# Patient Record
(demographics unavailable — no encounter records)

---

## 2024-10-14 NOTE — PHYSICAL EXAM
[FreeTextEntry1] : General - NAD Neuro MOCA  scored today:  22.5/30 Oct 8,  20.5/30 in August, and 19/30 in June Executive functioning - [place] B - successful without cues  Attention: Serial 7's - successful only to 93. Serial 5's - successful to 80 without cues or delays. Simple calculations - successful  Memory: Recall 3 words - Cat, Shoe, Table - Cat with cues, guessed "shoe" with cues, couldn't recall "table" Days of the week in reverse - successful  Abstract reasoning - successful  AAO to year, not to month, not to day of week without looking at cell phone, not to place without cues, alert to self, not aware of medical situation.    CN 2-12 WNL AQUILES UE and LE 5/5 Finger to nose and heel to meade AQUILES WNL   Light touch sensation intact bilaterally UE and LE

## 2024-10-14 NOTE — HISTORY OF PRESENT ILLNESS
[FreeTextEntry1] :   45 yo RHD M w/ h/o HTN, pneumothorax, anxiety, and current tobacco use (2ppd), who was BIBEMS to [facility] on 09/14/2023 with anoxic brain injury following v-fib arrest w/ ROSC at home.  Last seen 8-7-2024: Patient is accompanied with his wife and mother.  Wife states that her  is awake and alert, but has no motivation or initiation of actions.  His short term memory may be somewhat improving, but wife states that he still barely remembers anything on his own, without hints or cues.  Mr Carranza agrees that he is having trouble remembering, and this is very frustrating to him.  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  He is currently taking Modafinil 200 and amantadine 100 in the morning.  When the modafinil increased from 150 to 200 on Sept 12, there were no major changes in alertness or initiation.    He denies having any mood issues in the past, but that he now gets more emotional than he used to, though he and his wife state that he is not "down or depressed". He is expressing more enthusiasm and tearfulness with enthusiasm, but not with sadness or anger.    He is having trouble initiating activities, and he states that it is because he doesn't "think to do these things".  He needs prompting to take a shower, and it took him two or more hours to take a shower with constant prompting.  He requires cues to remember to brush his teeth, get dressed, remember to eat.  He does sleep well at night, about 8 hours per night.  They take a daily walk, about 30 minutes.  He has not had any falls or loss of balance.    He is not currently in any therapies, because he was not progressing any further in his therapies.  He is in acupuncture now every other week, and has been doing it for 8 weeks, but he does not feel that it is helping.      Last seen 6/10/24. His amantadine and modafinil were stopped last visit. 6/13--wife called reporting pt. was too lethargic. restarted on lower dose of modafinil 100mg. MRI brain was ordered. On 6/20 arousal was still an issue-- wife called-- added amantadine 100mg. Stopped Mirtazepine. Referred for a Trial of acupuncture. wife reports he is doing better. Alert and awake during the day. slightly better initiation and engagement. sleeping through the night off mirtazapine.  He was much better off of donepezil and memantine-- no longer having GI sx.  Supervision with ADLs-- needs prompts. ambulates inside and outside home with supervision. No falls  Meds: bisaprolol 2.5mg brillinta 90mg bid amantadine 100 mg modafinil 200mg -- increased by Dr. Darlin Ndiaye

## 2024-10-14 NOTE — PHYSICAL EXAM
[FreeTextEntry1] : General - NAD Neuro MOCA  scored today:  22.5/30 Oct 8,  20.5/30 in August, and 19/30 in June Executive functioning - [place] B - successful without cues  Attention: Serial 7's - successful only to 93. Serial 5's - successful to 80 without cues or delays. Simple calculations - successful  Memory: Recall 3 words - Cat, Shoe, Table - Cat with cues, guessed "shoe" with cues, couldn't recall "table" Days of the week in reverse - successful  Abstract reasoning - successful  AAO to year, not to month, not to day of week without looking at cell phone, not to place without cues, alert to self, not aware of medical situation.    CN 2-12 WNL AQUILES UE and LE 5/5 Finger to nose and heel to meade AQUILES WNL   Light touch sensation intact bilaterally UE and LE

## 2024-10-14 NOTE — ASSESSMENT
[FreeTextEntry1] : --Consider re-initiating Neuropsychology to work on cognitive therapies, Referral to Dr Abbe Gustafson, [facility] placed today  Plan to research photobiomodulation to discuss any therapeutic potential with Raul and his wife as well as look into literature on any other Treatments that have shown benefit for Anoxic brain injury.   Trial Rivastigmine patch once per day, prescribed today  Can discontinue acupuncture if family does not notice improvement Follow up in 6 weeks.

## 2024-10-14 NOTE — HISTORY OF PRESENT ILLNESS
[FreeTextEntry1] :   47 yo RHD M w/ h/o HTN, pneumothorax, anxiety, and current tobacco use (2ppd), who was BIBEMS to [facility] on 09/14/2023 with anoxic brain injury following v-fib arrest w/ ROSC at home.  Last seen 8-7-2024: Patient is accompanied with his wife and mother.  Wife states that her  is awake and alert, but has no motivation or initiation of actions.  His short term memory may be somewhat improving, but wife states that he still barely remembers anything on his own, without hints or cues.  Mr Carranza agrees that he is having trouble remembering, and this is very frustrating to him.  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  He is currently taking Modafinil 200 and amantadine 100 in the morning.  When the modafinil increased from 150 to 200 on Sept 12, there were no major changes in alertness or initiation.    He denies having any mood issues in the past, but that he now gets more emotional than he used to, though he and his wife state that he is not "down or depressed". He is expressing more enthusiasm and tearfulness with enthusiasm, but not with sadness or anger.    He is having trouble initiating activities, and he states that it is because he doesn't "think to do these things".  He needs prompting to take a shower, and it took him two or more hours to take a shower with constant prompting.  He requires cues to remember to brush his teeth, get dressed, remember to eat.  He does sleep well at night, about 8 hours per night.  They take a daily walk, about 30 minutes.  He has not had any falls or loss of balance.    He is not currently in any therapies, because he was not progressing any further in his therapies.  He is in acupuncture now every other week, and has been doing it for 8 weeks, but he does not feel that it is helping.      Last seen 6/10/24. His amantadine and modafinil were stopped last visit. 6/13--wife called reporting pt. was too lethargic. restarted on lower dose of modafinil 100mg. MRI brain was ordered. On 6/20 arousal was still an issue-- wife called-- added amantadine 100mg. Stopped Mirtazepine. Referred for a Trial of acupuncture. wife reports he is doing better. Alert and awake during the day. slightly better initiation and engagement. sleeping through the night off mirtazapine.  He was much better off of donepezil and memantine-- no longer having GI sx.  Supervision with ADLs-- needs prompts. ambulates inside and outside home with supervision. No falls  Meds: bisaprolol 2.5mg brillinta 90mg bid amantadine 100 mg modafinil 200mg -- increased by Dr. Darlin Ndiaye

## 2024-10-14 NOTE — ASSESSMENT
[FreeTextEntry1] : --Consider re-initiating Neuropsychology to work on cognitive therapies, Referral to Dr Abbe Gustafson, [facility] placed today  Plan to research photobiomodulation to discuss any therapeutic potential with Raul and his wife as well as look into literature on any other Treatments that have shown benefit for Anoxic brain injury.   Trial Rivastigmine patch once per day, prescribed today  Can discontinue acupuncture if family does not notice improvement Follow up in 6 weeks.

## 2024-11-22 NOTE — PHYSICAL EXAM
[FreeTextEntry1] : General - NAD Neuro MOCA scored today: 22.5/30 on Nov 19 (version 7.2); 22.5/30 Oct 8, 20.5/30 in August, and 19/30 in June Executive functioning - [place] B - successful with some initial cues Attention: Serial 7's - successful only to 93. Serial 5's - successful to 50 without cues or delays. Simple calculations - successful Memory: Recall 3 words - no recall with category or mult choice cues Days of the week in reverse - successful Abstract reasoning - successful AAO to year, not to month, not to day of week without looking at cell phone, not to place without cues, alert to self, not aware of medical situation.  CN 2-12 WNL AQUILES UE and LE 5/5 Finger to nose and heel to meade AQUILES WNL Light touch sensation intact bilaterally UE and LE.

## 2024-11-22 NOTE — ASSESSMENT
[FreeTextEntry1] : Prescription refills written for - amantadine 100 mg daily; modafinil 200mg daily Increased dose of - Rivastigmine patch, 9.5 mg daily, prescription written Referral to Dr Twin Tate at [facility] for neuro-Psychiatry Continue with Dr Gustafson at [facility] Follow up December 23  All questions answered.

## 2024-11-22 NOTE — HISTORY OF PRESENT ILLNESS
[FreeTextEntry1] : 47 yo RHD M w/ h/o HTN, pneumothorax, anxiety, and current tobacco use (2ppd), who was BIBEMS to [facility] on 09/14/2023 with anoxic brain injury following v-fib arrest w/ ROSC at home.  Last seen 10/8/24-- Started Trial of Rivastigmine 4.6mg patch and Neuropsychology referral to Dr. Gustafson.  No major changes with the rivastigmine patch, though there are some days that he is slightly more social/verbal. Wife notes pt's friend who sees him weekly has reported he felt Raul has improved engagement and response time.   Mr Carranza has bene having very low appetite for the last few weeks, with slight improvement the past week, eating dinner and lunch regularly as opposed to just eating dinner, before that . He has not had any illnesses, fevers, diarrhea.  He states that he doesn't really feel hungry during the day.  He has never eaten breakfast, but has always eaten dinner.    Mr Carranza did see Dr Gustafson last week, which his wife felt was very helpful.  She prompted Raul to set an alarm to write her an email about his activities for the day.  The appointment with Dr Gustafson was Nov 12.  Their follow up is Nov 21.    Mr Carranza saw his cardiologist for a repeat ECHO, which the cardiologist said he could repeat again in one year. They have follow up blood work soon.    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   No falls recently.   and Mrs Carranza take walks still on a regular basis, on weekends, and some nights after work.    Supervision with ADLs-- needs prompts, even to go to the bathroom, showering.  Ambulates inside and outside home with supervision.   Meds: bisaprolol 2.5mg brillinta 60mg bid asa amantadine 100 mg daily modafinil 200mg -- prescribed by Dr. Saeed Buproprion 100mg -- started by Dr Saeed September 12 per wife Rivastigmine patch, 4.5 mg daily

## 2024-11-22 NOTE — ASSESSMENT
[FreeTextEntry1] : Prescription refills written for - amantadine 100 mg daily; modafinil 200mg daily Increased dose of - Rivastigmine patch, 9.5 mg daily, prescription written Referral to Dr Twin Tate at [facility] for neuro-Psychiatry Continue with Dr Gustafson at [facility] Follow up December 23  All questions answered.

## 2024-11-22 NOTE — REASON FOR VISIT
[Follow-Up] : a follow-up visit [Spouse] : spouse [Family Member] : family member [FreeTextEntry1] : anoxic brain injury

## 2024-11-22 NOTE — HISTORY OF PRESENT ILLNESS
[FreeTextEntry1] : 47 yo RHD M w/ h/o HTN, pneumothorax, anxiety, and current tobacco use (2ppd), who was BIBEMS to [facility] on 09/14/2023 with anoxic brain injury following v-fib arrest w/ ROSC at home.  Last seen 10/8/24-- Started Trial of Rivastigmine 4.6mg patch and Neuropsychology referral to Dr. Gustafson.  No major changes with the rivastigmine patch, though there are some days that he is slightly more social/verbal. Wife notes pt's friend who sees him weekly has reported he felt Raul has improved engagement and response time.   Mr Carranza has bene having very low appetite for the last few weeks, with slight improvement the past week, eating dinner and lunch regularly as opposed to just eating dinner, before that . He has not had any illnesses, fevers, diarrhea.  He states that he doesn't really feel hungry during the day.  He has never eaten breakfast, but has always eaten dinner.    Mr Carranza did see Dr Gustafson last week, which his wife felt was very helpful.  She prompted Raul to set an alarm to write her an email about his activities for the day.  The appointment with Dr Gustafson was Nov 12.  Their follow up is Nov 21.    Mr Carranza saw his cardiologist for a repeat ECHO, which the cardiologist said he could repeat again in one year. They have follow up blood work soon.    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   No falls recently.   and Mrs Carranza take walks still on a regular basis, on weekends, and some nights after work.    Supervision with ADLs-- needs prompts, even to go to the bathroom, showering.  Ambulates inside and outside home with supervision.   Meds: bisaprolol 2.5mg brillinta 60mg bid asa amantadine 100 mg daily modafinil 200mg -- prescribed by Dr. Saeed Buproprion 100mg -- started by Dr Saeed September 12 per wife Rivastigmine patch, 4.5 mg daily normal sinus rhythm, Normal axis, Normal IL interval and QRS complex. There are no acute ischemic ST or T-wave changes.

## 2024-11-22 NOTE — END OF VISIT
[] : Fellow [FreeTextEntry3] :  Patient seen with fellow.  Agree with documentation as above. amended where appropriate. [Time Spent: ___ minutes] : I have spent [unfilled] minutes of time on the encounter which excludes teaching and separately reported services.

## 2024-11-22 NOTE — PHYSICAL EXAM
[FreeTextEntry1] : General - NAD Neuro MOCA scored today: 22.5/30 on Nov 19 (version 7.2); 22.5/30 Oct 8, 20.5/30 in August, and 19/30 in June Executive functioning - [place] B - successful with some initial cues Attention: Serial 7's - successful only to 93. Serial 5's - successful to 50 without cues or delays. Simple calculations - successful Memory: Recall 3 words - no recall with category or mult choice cues Days of the week in reverse - successful Abstract reasoning - successful AAO to year, not to month, not to day of week without looking at cell phone, not to place without cues, alert to self, not aware of medical situation.  CN 2-12 WNL AQUILES UE and LE 5/5 Finger to nose and heel to meade AQUILES WNL Light touch sensation intact bilaterally UE and LE.

## 2024-11-22 NOTE — ASSESSMENT
[FreeTextEntry1] : Prescription refills written for - amantadine 100 mg daily; modafinil 200mg daily Increased dose of - Rivastigmine patch, 9.5 mg daily, prescription written Referral to Dr Twin Tate at [facility] for neuro-Psychiatry Continue with Dr Gustafson at [facility] Follow up December 23  All questions answered.

## 2024-11-22 NOTE — PHYSICAL EXAM
[FreeTextEntry1] : General - NAD Neuro MOCA scored today: 22.5/30 on Nov 19 (version 7.2); 22.5/30 Oct 8, 20.5/30 in August, and 19/30 in June Executive functioning - [place] B - successful with some initial cues Attention: Serial 7's - successful only to 93. Serial 5's - successful to 50 without cues or delays. Simple calculations - successful Memory: Recall 3 words - no recall with category or mult choice cues Days of the week in reverse - successful Abstract reasoning - successful AAO to year, not to month, not to day of week without looking at cell phone, not to place without cues, alert to self, not aware of medical situation.  CN 2-12 WNL AQUILES UE and LE 5/5 Finger to nose and heel to meade AQUILES WNL Light touch sensation intact bilaterally UE and LE.

## 2024-12-23 NOTE — PHYSICAL EXAM
[FreeTextEntry1] : General - NAD Neuro MOCA scored today: 21.5/30 today-- was 22.5 on Nov 19  Executive functioning - Good-- able to complete [place], cube copy and clock drawing  Attention: Serial 7's - successful only to 93. Serial 5's - successful without cues or delays. Simple calculations - successful, DOWB and reverse digit span is good Memory: Recall 0/5 words -even when given visualization strategy to help remember; 4/5 with Cat. cues.  and 5/5 with MC cues Abstract reasoning - successful AAO to year, not to month, or date and day.  knows place and city  CN 2-12 WNL AQUILES UE and LE 5/5 Finger to nose and heel to meade AQUILES WNL Light touch sensation intact bilaterally UE and LE.

## 2024-12-23 NOTE — ASSESSMENT
[FreeTextEntry1] : Patient will likely benefit from trial of Ritalin to help improve his attention as pt. gets easily distracted, Processing speed and initiation.   Will need clearance from patient's cardiologist due to his cardiac history.   Risks/benefits d/w pt's wife and mother.   Wife will contact pt's cardiologist to discuss if pt. can be cleared for a trial of Ritalin.  Wife is interested in seeing if it helps pt.   Refilled pt's modafinil and amantadine  Trial increasing Rivastigmine patch to 13.3mg daily.    All questions answered.

## 2024-12-23 NOTE — HISTORY OF PRESENT ILLNESS
[FreeTextEntry1] : 48 yo RHD M w/ h/o HTN, pneumothorax, anxiety, and current tobacco use (2ppd), who [facility] with anoxic brain injury following v-fib arrest w/ ROSC at home.  Last seen 11/9/24--trialed increased dose of rivastigmine patch and refill patient's amantadine and modafinil.  Patient was referred to [facility] neuropsychiatry-Dr. Twin Hooker  Patient is following with Dr. Gustafson at [facility] for counseling.  Dr. Gustafson states that he has been seen for a little over a month and he is making progress.  He is oriented to time now.  He forgets pretty rapidly but she is working on teaching him strategies to compensate for his memory impairment.  His time to get into the shower is decreasing. Wife agrees pt. is getting benefit from counseling and treatment with Dr. Gustafson but still limited in his initiation and cognition.  She states when instructed pt. will take out the garbage and do some simple chores at home.  Earlier this week pt. put together a children's riding vehicle that they had bought as a [employer] gift for one of their relatives independently.    Still waiting to get an appointment at [facility] Neuropsychiatry--wife called earlier but no appt. were available at that time so she is going to call after the New Year.    Wife states not much improvement in Cognition or memory or initiation.   from increase in Rivastigmine in cognition/Memory.   Pt. with poor appetite.  Had GI Consult which did not find any abnormalities or cause.   Pt. sometimes states he is hungry but when wife or his mom give him food he only eats a little.  They note sometimes he tends to get distracted when eating.  They are giving him Boost shakes twice daily.    Meds: bisaprolol 2.5mg brillinta 60mg bid asa amantadine 100 mg daily modafinil 200mg -- prescribed by Dr. Saeed Buproprion 100mg -- started by Dr Saeed September 12 per wife Rivastigmine patch, 9.5 mg daily

## 2025-02-02 NOTE — REASON FOR VISIT
[Follow-Up] : a follow-up visit [Spouse] : spouse [Parent] : parent [FreeTextEntry1] : anoxic brain injury.

## 2025-02-02 NOTE — ASSESSMENT
[FreeTextEntry1] : start tapering Modafinil-- can have GI side effects that can affect appetite.  Wife has not noticed much improvement on this medication-- decrease to 100mg in AM  -- Pt. going to PCP for routine blood work later this week.   -- Will check Neuroendocrine panel-- Labs ordered.   --f/u with Neuropsychiatry at [facility] for evaluation  -- All questions answered.

## 2025-02-02 NOTE — PHYSICAL EXAM
[FreeTextEntry1] : General - NAD Neuro Orientation-- Ox 2-3 --2025 and Jan with cues.  Used ext. cues for date independently.  Attn: able to complete DOWB,  Serial 7s--x 1;  Can do Serial 5's MOCA scored 12/23: 21.5/30 --- was 22.5 on Nov 19 Executive functioning - Good-- able to complete [place], cube copy and clock drawing Attention: Serial 7's - successful only to 93. Serial 5's - successful without cues or delays. Simple calculations - successful, DOWB and reverse digit span is good Memory: Recall 0/5 words -even when given visualization strategy to help remember; 4/5 with Cat. cues. and 5/5 with MC cues Abstract reasoning - successful AAO to year, not to month, or date and day. knows place and city  CN--EOMI, VF intact.,  AQUILES UE and LE 5/5 Finger to nose and heel to meade AQUILES WNL Light touch sensation intact bilaterally UE and LE.

## 2025-02-02 NOTE — ASSESSMENT
[FreeTextEntry1] : start tapering Modafinil-- can have GI side effects that can affect appetite.  Wife has not noticed much improvement on this medication-- decrease to 100mg in AM  -- Pt. going to PCP for routine blood work later this week.   -- Will check Neuroendocrine panel-- Labs ordered.   --f/u with Neuropsychiatry at [facility] for evaluation  -- All questions answered.

## 2025-03-07 NOTE — HISTORY OF PRESENT ILLNESS
[FreeTextEntry1] : 48 y/o RHD M w/ h/o HTN, pneumothorax, anxiety, and current tobacco use (2ppd), who was BIBEMS to [facility] on 09/14/2023 with anoxic brain injury following v-fib arrest w/ ROSC at home.  Last seen 1/27/25. tapered modafinil to 100mg in a.M.   Ordered neuroendocrine panel.   pt's testosterone level is low which can cause fatigue, poor energy and motivation, mood changes and possibly affect appetite.  Spoke with wife and recommended assessment with Endocrinology for evaluation.  Endocrinology appointment is months away so their office recommended that patient follow-up with urology for assessment of low testosterone level.  Has appt. with Urology 3/7-- New Neurology appt in [place] on 3/6  Wife denies any increased fatigue with decrease in modafinil.  She states patient is about the same with still very poor initiation and significant cognitive deficits.  She states sometimes patient will be more engaged and interactive and some days very minimal interaction.  Patient is following up about once a week or every other week for counseling and cognitive remediation with Dr. Gustafson.  Wife reports this is helping --she would like to take him more often but financially cannot afford it.  Meds: bisaprolol 2.5mg brillinta 60mg bid asa amantadine 100 mg daily modafinil 100mg --  Rivastigmine patch, 13.3 mg daily--increased

## 2025-03-07 NOTE — HISTORY OF PRESENT ILLNESS
[FreeTextEntry1] : 46 y/o RHD M w/ h/o HTN, pneumothorax, anxiety, and current tobacco use (2ppd), who was BIBEMS to [facility] on 09/14/2023 with anoxic brain injury following v-fib arrest w/ ROSC at home.  Last seen 1/27/25. tapered modafinil to 100mg in a.M.   Ordered neuroendocrine panel.   pt's testosterone level is low which can cause fatigue, poor energy and motivation, mood changes and possibly affect appetite.  Spoke with wife and recommended assessment with Endocrinology for evaluation.  Endocrinology appointment is months away so their office recommended that patient follow-up with urology for assessment of low testosterone level.  Has appt. with Urology 3/7-- New Neurology appt in [place] on 3/6  Wife denies any increased fatigue with decrease in modafinil.  She states patient is about the same with still very poor initiation and significant cognitive deficits.  She states sometimes patient will be more engaged and interactive and some days very minimal interaction.  Patient is following up about once a week or every other week for counseling and cognitive remediation with Dr. Gustafson.  Wife reports this is helping --she would like to take him more often but financially cannot afford it.  Meds: bisaprolol 2.5mg brillinta 60mg bid asa amantadine 100 mg daily modafinil 100mg --  Rivastigmine patch, 13.3 mg daily--increased

## 2025-03-07 NOTE — ASSESSMENT
[FreeTextEntry1] : Patient will benefit from course of outpatient speech therapy--patient last attended a year ago but he has made some progress with neuropsychology cognitive remediation and would likely benefit from structured cognitive therapy with speech therapy on a regular basis.  Recommend wife to follow-up with urology to address low testosterone level as correction of this may help with patient's fatigue, cognition and motivation.  Renewed medications-continue current medications of modafinil 100 mg in the a.m., amantadine 100 mg in the a.m. and rivastigmine patch-scripts provided  All questions answered  Follow-up in 2 months

## 2025-05-05 NOTE — ASSESSMENT
[FreeTextEntry1] : Trial Megace 20mg daily  Renewed amantadine and rivastigmine.   speech therapy Rx.

## 2025-05-05 NOTE — HISTORY OF PRESENT ILLNESS
[FreeTextEntry1] : 48 y/o RHD M w/ h/o HTN, pneumothorax, anxiety, and current tobacco use (2ppd), who was BIBEMS to [facility] on 09/14/2023 with anoxic brain injury following v-fib arrest w/ ROSC at home.  Last seen 2/27/25.  Last visit Recommend wife to follow-up with urology to address low testosterone level as correction of this may help with patient's fatigue, cognition and motivation and Renewed amantadine, modafinil, and rivastigmine patch.   Has been following with Neuropsychiatry, Dr. Tate--   Started Wellbutrin 150mg XL on 4/18/25 x 7 days and after was increased to 300mg daily.  Stopped Modafinil.  Patient has been more interactive and talkative at home.  Wife has to initiate it.  Pt. has been expressing frustration that his STM is poor.   Getting more awareness of his deficits since last week.   Still has poor appetite.    Dr. Tate referred for Outpatient speech therapy-- eval is in June.    Has been following with Dr. Gustafson-- next appt. is Tuesday.  Was seeing her weekly but wife is having a hard time financially affording it.     Meds: bisaprolol 2.5mg brillinta 60mg bid asa amantadine 100 mg daily modafinil 100mg -- Stopped by Dr. Tate on 4/18 Rivastigmine patch, 13.3 mg daily-- Wellbutrin XL 300mg Susan

## 2025-05-05 NOTE — PHYSICAL EXAM
[FreeTextEntry1] : General - NAD Neuro Orientation-- Ox 2-3 --2025 and Jan with cues. Used ext. cues for date independently. Attn: able to complete DOWB, Serial 7s--x 1; Can do Serial 5's MOCA scored --21/30 Executive functioning - Good-- able to complete Trails B, cube copy and clock drawing Attention: Serial 7's - successful only to 93. Serial 5's - successful without cues or delays. Simple calculations - successful, DOWB and reverse digit span is good Memory: Recall 0/5 words -even when given visualization strategy to help remember; 4/5 with Cat. cues. and 5/5 with MC cues Abstract reasoning - successful AAO to year, not to month, or date and day. knows place and city  CN--EOMI, VF intact., AQUILES UE and LE 5/5 Finger to nose and heel to meade AQUILES WNL Light touch sensation intact bilaterally UE and LE.

## 2025-07-08 NOTE — HISTORY OF PRESENT ILLNESS
[FreeTextEntry1] : 46 y/o RHD M w/ h/o HTN, pneumothorax, anxiety, and current tobacco use (2ppd), who was BIBEMS to [facility] on 09/14/2023 with anoxic brain injury following v-fib arrest w/ ROSC at home.  Last seen  5/5/25.  Last visit renewed amantadine and rivastigmine.  Consider trial of Megace but ultimately ended up restarting mirtazapine 7.5 mg qhs for appetite stimulant.  Pt's appetite has improved.  Pt. reports his memory is improving a little.  Pt and his wife report that memory is still a challenging. Pt. also has issues with initiation -- some days wont' go to the bathroom   ** Pt. has had some improvement as per his wife-- has better awareness of his deficits,  will get emotional with his limitations.    Pt. was referred for speech therapy last visit.   Has been approved -- will start therapy at [facility] in [facility]--    follows with Dr. Tate for neuropsychiatry-- pt. tapered off June 21st.   Meds: bisaprolol 2.5mg brillinta 60mg bid asa amantadine 100 mg daily Wellbutrin XL 300mg Rupatha Mirtazepine 7.5mg

## 2025-07-08 NOTE — PHYSICAL EXAM
[FreeTextEntry1] :      General - NAD  from May--  Neuro Orientation-- Ox 2-3 --2025 and Jan with cues. Used ext. cues for date independently. Attn: able to complete DOWB, Serial 7s--x 1; Can do Serial 5's MOCA scored --21/30 Executive functioning - Good-- able to complete Trails B, cube copy and clock drawing Attention: Serial 7's - successful only to 93. Serial 5's - successful without cues or delays. Simple calculations - successful, DOWB and reverse digit span is good Memory: Recall 0/5 words -even when given visualization strategy to help remember; 4/5 with Cat. cues. and 5/5 with MC cues Abstract reasoning - successful AAO to year, not to month, or date and day. knows place and city  CN--EOMI, VF intact., AQUILES UE and LE 5/5 Finger to nose and heel to meade AQUILES WNL Light touch sensation intact bilaterally UE and LE.